# Patient Record
Sex: FEMALE | Race: BLACK OR AFRICAN AMERICAN | URBAN - METROPOLITAN AREA
[De-identification: names, ages, dates, MRNs, and addresses within clinical notes are randomized per-mention and may not be internally consistent; named-entity substitution may affect disease eponyms.]

---

## 2024-06-08 ENCOUNTER — OFFICE VISIT (OUTPATIENT)
Age: 24
End: 2024-06-08

## 2024-06-08 VITALS
HEART RATE: 90 BPM | SYSTOLIC BLOOD PRESSURE: 107 MMHG | DIASTOLIC BLOOD PRESSURE: 69 MMHG | OXYGEN SATURATION: 95 % | WEIGHT: 162 LBS | TEMPERATURE: 99.1 F

## 2024-06-08 DIAGNOSIS — J02.9 SORE THROAT: ICD-10-CM

## 2024-06-08 DIAGNOSIS — J32.9 SINUSITIS, UNSPECIFIED CHRONICITY, UNSPECIFIED LOCATION: Primary | ICD-10-CM

## 2024-06-08 LAB — S PYO AG THROAT QL: NORMAL

## 2024-06-08 NOTE — PROGRESS NOTES
Karen Bruner (:  2000) is a 23 y.o. female,New patient, here for evaluation of the following chief complaint(s):  Pharyngitis (1 day symptoms), Chills (1 day symptoms), and Headache (1 day symptoms)      ASSESSMENT/PLAN:    ICD-10-CM    1. Sinusitis, unspecified chronicity, unspecified location  J32.9       2. Sore throat  J02.9 POCT rapid strep A          Patient presents for sore throat and sinus pressure. On exam no wheezes, rhonchi, rales noted.   POCT strep negative.   DDX: seasonal allergies vs viral/bacterial URI  Discussed symptomatic management and return precautions.   Follow up with PCP in 7-10 days if symptoms persist or if symptoms worsen.    SUBJECTIVE/OBJECTIVE:    History provided by:  Patient   used: No      HPI:   23 y.o. female presents with symptoms of sore throat, chills, and headache ongoing for one day. She admits to sinus pressure as well, diffuse. No known sick contacts. She does not have asthma, does not use inhalers. No fevers. Has not taken medications for symptoms. Admits to a dry non productive cough with this.         Vitals:    24 1820 24 1822   BP: 94/61 107/69   Site: Left Upper Arm Left Upper Arm   Position: Sitting Sitting   Cuff Size: Medium Adult Medium Adult   Pulse: 90 90   Temp: 99.1 °F (37.3 °C) 99.1 °F (37.3 °C)   TempSrc: Oral Oral   SpO2: 95% 95%   Weight: 73.8 kg (162 lb 9.6 oz) 73.5 kg (162 lb)       Review of Systems   Constitutional:  Positive for chills. Negative for diaphoresis, fatigue and fever.   HENT:  Positive for congestion and sore throat.    Respiratory:  Positive for cough. Negative for chest tightness, shortness of breath and wheezing.    Cardiovascular:  Negative for chest pain.   Gastrointestinal:  Negative for abdominal pain, constipation, diarrhea, nausea and vomiting.   Musculoskeletal:  Negative for neck pain and neck stiffness.   Skin:  Negative for rash.       Physical Exam  Vitals and nursing note

## 2024-06-08 NOTE — PATIENT INSTRUCTIONS
Please start zyrtec/cetrizine and flonase nasal spray  You can also start mucinex for symptoms   If symptoms persist for more than 7 days please return here or see PCP.